# Patient Record
Sex: MALE | Race: WHITE | Employment: UNEMPLOYED | URBAN - METROPOLITAN AREA
[De-identification: names, ages, dates, MRNs, and addresses within clinical notes are randomized per-mention and may not be internally consistent; named-entity substitution may affect disease eponyms.]

---

## 2019-05-04 ENCOUNTER — HOSPITAL ENCOUNTER (EMERGENCY)
Age: 39
Discharge: HOME | End: 2019-05-04
Attending: EMERGENCY MEDICINE
Payer: COMMERCIAL

## 2019-05-04 VITALS
WEIGHT: 192 LBS | SYSTOLIC BLOOD PRESSURE: 136 MMHG | RESPIRATION RATE: 16 BRPM | HEART RATE: 56 BPM | OXYGEN SATURATION: 99 % | BODY MASS INDEX: 25.33 KG/M2 | DIASTOLIC BLOOD PRESSURE: 88 MMHG | TEMPERATURE: 98.4 F

## 2019-05-04 DIAGNOSIS — R11.2 NAUSEA AND VOMITING, INTRACTABILITY OF VOMITING NOT SPECIFIED, UNSPECIFIED VOMITING TYPE: Primary | ICD-10-CM

## 2019-05-04 DIAGNOSIS — A64 STD (MALE): ICD-10-CM

## 2019-05-04 LAB
A/G RATIO: 1.3 (ref 1.1–2.2)
ALBUMIN SERPL-MCNC: 4.3 G/DL (ref 3.4–5)
ALP BLD-CCNC: 65 U/L (ref 40–129)
ALT SERPL-CCNC: 13 U/L (ref 10–40)
ANION GAP SERPL CALCULATED.3IONS-SCNC: 13 MMOL/L (ref 3–16)
AST SERPL-CCNC: 19 U/L (ref 15–37)
BILIRUB SERPL-MCNC: 0.7 MG/DL (ref 0–1)
BUN BLDV-MCNC: 10 MG/DL (ref 7–20)
CALCIUM SERPL-MCNC: 9 MG/DL (ref 8.3–10.6)
CHLORIDE BLD-SCNC: 100 MMOL/L (ref 99–110)
CO2: 24 MMOL/L (ref 21–32)
CREAT SERPL-MCNC: 1 MG/DL (ref 0.9–1.3)
GFR AFRICAN AMERICAN: >60
GFR NON-AFRICAN AMERICAN: >60
GLOBULIN: 3.3 G/DL
GLUCOSE BLD-MCNC: 91 MG/DL (ref 70–99)
HCT VFR BLD CALC: 43.9 % (ref 40.5–52.5)
HEMOGLOBIN: 14.3 G/DL (ref 13.5–17.5)
LIPASE: 31 U/L (ref 13–60)
MAGNESIUM: 2.1 MG/DL (ref 1.8–2.4)
MCH RBC QN AUTO: 28.8 PG (ref 26–34)
MCHC RBC AUTO-ENTMCNC: 32.5 G/DL (ref 31–36)
MCV RBC AUTO: 88.7 FL (ref 80–100)
PDW BLD-RTO: 14.6 % (ref 12.4–15.4)
PLATELET # BLD: 201 K/UL (ref 135–450)
PMV BLD AUTO: 9.7 FL (ref 5–10.5)
POTASSIUM REFLEX MAGNESIUM: 3.5 MMOL/L (ref 3.5–5.1)
RBC # BLD: 4.95 M/UL (ref 4.2–5.9)
SODIUM BLD-SCNC: 137 MMOL/L (ref 136–145)
TOTAL PROTEIN: 7.6 G/DL (ref 6.4–8.2)
WBC # BLD: 6.7 K/UL (ref 4–11)

## 2019-05-04 PROCEDURE — 87591 N.GONORRHOEAE DNA AMP PROB: CPT

## 2019-05-04 PROCEDURE — 85027 COMPLETE CBC AUTOMATED: CPT

## 2019-05-04 PROCEDURE — 99284 EMERGENCY DEPT VISIT MOD MDM: CPT

## 2019-05-04 PROCEDURE — 80053 COMPREHEN METABOLIC PANEL: CPT

## 2019-05-04 PROCEDURE — 83690 ASSAY OF LIPASE: CPT

## 2019-05-04 PROCEDURE — 87491 CHLMYD TRACH DNA AMP PROBE: CPT

## 2019-05-04 PROCEDURE — 86780 TREPONEMA PALLIDUM: CPT

## 2019-05-04 PROCEDURE — 83735 ASSAY OF MAGNESIUM: CPT

## 2019-05-04 RX ORDER — ONDANSETRON 4 MG/1
4 TABLET, ORALLY DISINTEGRATING ORAL EVERY 8 HOURS PRN
Qty: 20 TABLET | Refills: 0 | Status: SHIPPED | OUTPATIENT
Start: 2019-05-04

## 2019-05-04 ASSESSMENT — ENCOUNTER SYMPTOMS
NAUSEA: 1
STRIDOR: 0
VOICE CHANGE: 0
BLOOD IN STOOL: 0
COUGH: 0
SHORTNESS OF BREATH: 0
DIARRHEA: 0
PHOTOPHOBIA: 0
ABDOMINAL PAIN: 1
TROUBLE SWALLOWING: 0
CONSTIPATION: 0
FACIAL SWELLING: 0
VOMITING: 1

## 2019-05-04 NOTE — ED PROVIDER NOTES
[address] [facility] RESIDENT NOTE       Date of evaluation: 5/4/2019    Chief Complaint     Exposure to STD and Emesis      History of Present Illness     Jerrell Adams is a 46 y.o. male with history of schizophrenia who presents with nonbloody emesis. Patient reports a several month history of one to 2 episodes of nonbloody nonbilious emesis per day that are not associated with oral intake. He endorses intermittent generalized abdominal pain, but does not currently have abdominal pain at this time. He presents today because he is \"just tired of the symptoms. \"  He denies any dysuria, hematuria, diarrhea, constipation or blood in stool. He denies any fevers. No recent travel or previous abdominal surgeries. Last bowel movement was yesterday. He also presents with concern for exposure to STI. Reports he has had 3-4 female partners in the last 6 months, and he only intermittently uses condoms. He denies any penile discharge or scrotal tenderness, no history of STI. He reports \"a bump\" on the right side of his penile shaft that was present 2 months ago but was only present for about a day. He is unable to describe the bump more thoroughly. Review of Systems     Review of Systems   Constitutional: Negative for chills and fever. HENT: Negative for drooling, facial swelling, trouble swallowing and voice change. Eyes: Negative for photophobia and visual disturbance. Respiratory: Negative for cough, shortness of breath and stridor. Cardiovascular: Negative for chest pain and leg swelling. Gastrointestinal: Positive for abdominal pain, nausea and vomiting. Negative for blood in stool, constipation and diarrhea. Genitourinary: Negative for dysuria and hematuria. Musculoskeletal: Negative for neck pain. Skin: Negative for rash. Neurological: Negative for weakness and headaches. Psychiatric/Behavioral: Negative for confusion.        Past Medical, Surgical, Family, and Social History     He has a past medical history of Schizophrenia ([facility] [address] He has no past surgical history on file. His family history is not on file. He reports that he has been smoking cigarettes. He has been smoking about 0.50 packs per day. He has never used smokeless tobacco. He reports that he does not drink alcohol or use drugs. Medications     Discharge Medication List as of 5/4/2019  4:39 PM          Allergies     He is allergic to depakote [divalproex sodium]. Physical Exam     INITIAL VITALS: BP: 136/88, Temp: 98.4 °F (36.9 °C), Pulse: 56, Resp: 16, SpO2: 99 %   Physical Exam   Constitutional: He is oriented to person, place, and time. He appears well-developed. No distress. HENT:   Head: Normocephalic and atraumatic. Eyes: Pupils are equal, round, and reactive to light. EOM are normal. Right eye exhibits no discharge. Left eye exhibits no discharge. Neck: Normal range of motion. No tracheal deviation present. Cardiovascular: Normal rate and regular rhythm. Exam reveals no gallop and no friction rub. No murmur heard. Pulmonary/Chest: Effort normal. No stridor. No respiratory distress. He has no wheezes. He has no rales. Abdominal: Soft. He exhibits no distension. There is no tenderness. There is no rebound and no guarding. Genitourinary: Penis normal. No penile tenderness. Genitourinary Comments: Normal external male genitalia without rashes, lesions. No penile discharge. No scrotal tenderness. No inguinal lymphadenopathy. Musculoskeletal: Normal range of motion. He exhibits no deformity. Neurological: He is alert and oriented to person, place, and time. No cranial nerve deficit. Skin: Skin is warm. No rash noted. Psychiatric: He has a normal mood and affect.        DiagnosticResults       RADIOLOGY:  No orders to display       LABS:   Results for orders placed or performed during the hospital encounter of 05/04/19   CBC   Result Value Ref Range    WBC 6.7 4.0 - 11.0 K/uL    RBC 4.95 4.20 - 5.90 M/uL    Hemoglobin 14.3 13.5 - 17.5 g/dL    Hematocrit 43.9 40.5 - 52.5 %    MCV 88.7 80.0 - 100.0 fL    MCH 28.8 26.0 - 34.0 pg    MCHC 32.5 31.0 - 36.0 g/dL    RDW 14.6 12.4 - 15.4 %    Platelets 445 649 - 457 K/uL    MPV 9.7 5.0 - 10.5 fL   Comprehensive Metabolic Panel w/ Reflex to MG   Result Value Ref Range    Sodium 137 136 - 145 mmol/L    Potassium reflex Magnesium 3.5 3.5 - 5.1 mmol/L    Chloride 100 99 - 110 mmol/L    CO2 24 21 - 32 mmol/L    Anion Gap 13 3 - 16    Glucose 91 70 - 99 mg/dL    BUN 10 7 - 20 mg/dL    CREATININE 1.0 0.9 - 1.3 mg/dL    GFR Non-African American >60 >60    GFR African American >60 >60    Calcium 9.0 8.3 - 10.6 mg/dL    Total Protein 7.6 6.4 - 8.2 g/dL    Alb 4.3 3.4 - 5.0 g/dL    Albumin/Globulin Ratio 1.3 1.1 - 2.2    Total Bilirubin 0.7 0.0 - 1.0 mg/dL    Alkaline Phosphatase 65 40 - 129 U/L    ALT 13 10 - 40 U/L    AST 19 15 - 37 U/L    Globulin 3.3 g/dL   Lipase   Result Value Ref Range    Lipase 31.0 13.0 - 60.0 U/L   Magnesium   Result Value Ref Range    Magnesium 2.10 1.80 - 2.40 mg/dL       ED BEDSIDE ULTRASOUND:  n/a    RECENT VITALS:  BP: 136/88, Temp: 98.4 °F (36.9 °C), Pulse: 56,Resp: 16, SpO2: 99 %     Procedures     n/a    ED Course     Nursing Notes, Past Medical Hx, Past Surgical Hx, Social Hx, Allergies, and Family Hx were reviewed. The patient was given the followingmedications:  Orders Placed This Encounter   Medications    ondansetron (ZOFRAN ODT) 4 MG disintegrating tablet     Sig: Take 1 tablet by mouth every 8 hours as needed for Nausea     Dispense:  20 tablet     Refill:  0       CONSULTS:  None    MEDICAL DECISION MAKING / ASSESSMENT / Pierre Liliya is a 46 y.o. male with history of schizophrenia who presents with nonbloody emesis and concern for STI exposure.     In terms of emesis, unclear if this is cyclic cannabinoid syndrome as patient does endorse smoking marijuana 3 times a week.  CMP without any evidence of a likely abnormality or evidence of dehydration, LFTs and lipase within normal limits. Patient did not have any nausea on presentation, did not have any episodes of emesis throughout the ED course. He did request Zofran when necessary to take at home when he does get nauseous, this was provided at discharge. At this time, low suspicion for intra-abdominal infection, vascular pathology, or bowel abnormality given reassuring abdominal exam on reassessment. In terms of STI exposure,  exam is within normal limits. Coumadin gonorrhea were collected, however given that patient does not currently have symptoms,. Treatment was deferred this time. He was strongly encouraged to present to the Department of Health for HIV and syphilis testing as we are unable to complete that here. He is also encouraged to call back in 72 hours for his results. This patient was also evaluated by the attending physician. All care plans werediscussed and agreed upon. Clinical Impression     1. Nausea and vomiting, intractability of vomiting not specified, unspecified vomiting type    2.  STD (male)        Disposition     PATIENT REFERRED TO:  [facility] Emergency Department  [address] [place] [address]  [phone]  Go to   As needed      DISCHARGE MEDICATIONS:  Discharge Medication List as of 5/4/2019  4:39 PM      START taking these medications    Details   ondansetron (ZOFRAN ODT) 4 MG disintegrating tablet Take 1 tablet by mouth every 8 hours as needed for Nausea, Disp-20 tablet, R-[id number]             DISPOSITION Decision To Discharge 05/04/2019 04:29:34 Cecilia Banks MD  Resident  05/04/19 [id number]

## 2019-05-05 LAB — TOTAL SYPHILLIS IGG/IGM: NORMAL

## 2019-05-07 LAB
C. TRACHOMATIS DNA ,URINE: NEGATIVE
N. GONORRHOEAE DNA, URINE: NEGATIVE

## 2019-10-09 ENCOUNTER — HOSPITAL ENCOUNTER (EMERGENCY)
Age: 39
Discharge: HOME | End: 2019-10-09
Attending: EMERGENCY MEDICINE
Payer: COMMERCIAL

## 2019-10-09 VITALS
DIASTOLIC BLOOD PRESSURE: 89 MMHG | SYSTOLIC BLOOD PRESSURE: 157 MMHG | TEMPERATURE: 97.7 F | OXYGEN SATURATION: 100 % | RESPIRATION RATE: 18 BRPM | HEART RATE: 104 BPM

## 2019-10-09 DIAGNOSIS — T16.1XXA FOREIGN BODY OF EAR, RIGHT, INITIAL ENCOUNTER: Primary | ICD-10-CM

## 2019-10-09 PROCEDURE — 99282 EMERGENCY DEPT VISIT SF MDM: CPT

## 2019-10-09 PROCEDURE — 4500000023 HC ED LEVEL 3 PROCEDURE

## 2021-01-15 ENCOUNTER — HOSPITAL ENCOUNTER (EMERGENCY)
Age: 41
Discharge: HOME | End: 2021-01-15
Attending: EMERGENCY MEDICINE
Payer: COMMERCIAL

## 2021-01-15 ENCOUNTER — APPOINTMENT (OUTPATIENT)
Dept: GENERAL RADIOLOGY | Age: 41
End: 2021-01-15
Payer: COMMERCIAL

## 2021-01-15 VITALS
RESPIRATION RATE: 18 BRPM | HEIGHT: 74 IN | WEIGHT: 237 LBS | OXYGEN SATURATION: 99 % | HEART RATE: 71 BPM | BODY MASS INDEX: 30.42 KG/M2 | SYSTOLIC BLOOD PRESSURE: 121 MMHG | TEMPERATURE: 99.3 F | DIASTOLIC BLOOD PRESSURE: 82 MMHG

## 2021-01-15 DIAGNOSIS — R06.00 DYSPNEA, UNSPECIFIED TYPE: Primary | ICD-10-CM

## 2021-01-15 DIAGNOSIS — R42 LIGHTHEADEDNESS: ICD-10-CM

## 2021-01-15 DIAGNOSIS — J12.9 VIRAL PNEUMONIA: ICD-10-CM

## 2021-01-15 LAB
ANION GAP SERPL CALCULATED.3IONS-SCNC: 8 MMOL/L (ref 3–16)
BASOPHILS ABSOLUTE: 0 K/UL (ref 0–0.2)
BASOPHILS RELATIVE PERCENT: 0.4 %
BUN BLDV-MCNC: 14 MG/DL (ref 7–20)
CALCIUM SERPL-MCNC: 9.2 MG/DL (ref 8.3–10.6)
CHLORIDE BLD-SCNC: 104 MMOL/L (ref 99–110)
CO2: 28 MMOL/L (ref 21–32)
CREAT SERPL-MCNC: 1 MG/DL (ref 0.9–1.3)
EKG ATRIAL RATE: 77 BPM
EKG DIAGNOSIS: NORMAL
EKG P AXIS: 40 DEGREES
EKG P-R INTERVAL: 240 MS
EKG Q-T INTERVAL: 350 MS
EKG QRS DURATION: 104 MS
EKG QTC CALCULATION (BAZETT): 396 MS
EKG R AXIS: 42 DEGREES
EKG T AXIS: 22 DEGREES
EKG VENTRICULAR RATE: 77 BPM
EOSINOPHILS ABSOLUTE: 0.1 K/UL (ref 0–0.6)
EOSINOPHILS RELATIVE PERCENT: 0.5 %
GFR AFRICAN AMERICAN: >60
GFR NON-AFRICAN AMERICAN: >60
GLUCOSE BLD-MCNC: 87 MG/DL (ref 70–99)
HCT VFR BLD CALC: 41.9 % (ref 40.5–52.5)
HEMOGLOBIN: 13.9 G/DL (ref 13.5–17.5)
LYMPHOCYTES ABSOLUTE: 2 K/UL (ref 1–5.1)
LYMPHOCYTES RELATIVE PERCENT: 18.4 %
MCH RBC QN AUTO: 29.5 PG (ref 26–34)
MCHC RBC AUTO-ENTMCNC: 33 G/DL (ref 31–36)
MCV RBC AUTO: 89.2 FL (ref 80–100)
MONOCYTES ABSOLUTE: 0.8 K/UL (ref 0–1.3)
MONOCYTES RELATIVE PERCENT: 7.8 %
NEUTROPHILS ABSOLUTE: 7.8 K/UL (ref 1.7–7.7)
NEUTROPHILS RELATIVE PERCENT: 72.9 %
PDW BLD-RTO: 14.2 % (ref 12.4–15.4)
PLATELET # BLD: 199 K/UL (ref 135–450)
PMV BLD AUTO: 10.4 FL (ref 5–10.5)
POTASSIUM REFLEX MAGNESIUM: 4.1 MMOL/L (ref 3.5–5.1)
RAPID INFLUENZA  B AGN: NEGATIVE
RAPID INFLUENZA A AGN: NEGATIVE
RBC # BLD: 4.7 M/UL (ref 4.2–5.9)
SODIUM BLD-SCNC: 140 MMOL/L (ref 136–145)
WBC # BLD: 10.6 K/UL (ref 4–11)

## 2021-01-15 PROCEDURE — 80048 BASIC METABOLIC PNL TOTAL CA: CPT

## 2021-01-15 PROCEDURE — 2580000003 HC RX 258: Performed by: PHYSICIAN ASSISTANT

## 2021-01-15 PROCEDURE — 96374 THER/PROPH/DIAG INJ IV PUSH: CPT

## 2021-01-15 PROCEDURE — 71045 X-RAY EXAM CHEST 1 VIEW: CPT

## 2021-01-15 PROCEDURE — 99284 EMERGENCY DEPT VISIT MOD MDM: CPT

## 2021-01-15 PROCEDURE — 85025 COMPLETE CBC W/AUTO DIFF WBC: CPT

## 2021-01-15 PROCEDURE — U0003 INFECTIOUS AGENT DETECTION BY NUCLEIC ACID (DNA OR RNA); SEVERE ACUTE RESPIRATORY SYNDROME CORONAVIRUS 2 (SARS-COV-2) (CORONAVIRUS DISEASE [COVID-19]), AMPLIFIED PROBE TECHNIQUE, MAKING USE OF HIGH THROUGHPUT TECHNOLOGIES AS DESCRIBED BY CMS-2020-01-R: HCPCS

## 2021-01-15 PROCEDURE — 87804 INFLUENZA ASSAY W/OPTIC: CPT

## 2021-01-15 PROCEDURE — 93005 ELECTROCARDIOGRAM TRACING: CPT | Performed by: EMERGENCY MEDICINE

## 2021-01-15 PROCEDURE — 6360000002 HC RX W HCPCS: Performed by: PHYSICIAN ASSISTANT

## 2021-01-15 RX ORDER — KETOROLAC TROMETHAMINE 30 MG/ML
15 INJECTION, SOLUTION INTRAMUSCULAR; INTRAVENOUS ONCE
Status: COMPLETED | OUTPATIENT
Start: 2021-01-15 | End: 2021-01-15

## 2021-01-15 RX ORDER — IBUPROFEN 800 MG/1
800 TABLET ORAL EVERY 8 HOURS PRN
Qty: 30 TABLET | Refills: 0 | Status: SHIPPED | OUTPATIENT
Start: 2021-01-15

## 2021-01-15 RX ORDER — SODIUM CHLORIDE, SODIUM LACTATE, POTASSIUM CHLORIDE, CALCIUM CHLORIDE 600; 310; 30; 20 MG/100ML; MG/100ML; MG/100ML; MG/100ML
1000 INJECTION, SOLUTION INTRAVENOUS ONCE
Status: COMPLETED | OUTPATIENT
Start: 2021-01-15 | End: 2021-01-15

## 2021-01-15 RX ADMIN — SODIUM CHLORIDE, SODIUM LACTATE, POTASSIUM CHLORIDE, AND CALCIUM CHLORIDE 1000 ML: .6; .31; .03; .02 INJECTION, SOLUTION INTRAVENOUS at 19:25

## 2021-01-15 RX ADMIN — KETOROLAC TROMETHAMINE 15 MG: 30 INJECTION, SOLUTION INTRAMUSCULAR; INTRAVENOUS at 19:24

## 2021-01-15 NOTE — ED TRIAGE NOTES
Pt arrived to ED with shortness of breath and dizziness at night for the past 2 nights. States today shortness of breath happened during day as well. Denies any shortness of breath or dizziness at this time.

## 2021-01-15 NOTE — ED PROVIDER NOTES
[address] [facility] RESIDENT NOTE       Date of evaluation: 1/15/2021    Chief Complaint     Dizziness and Shortness of Breath      History of Present Illness     Anabel Alonso is a 45 y.o. male 3 of schizophrenia, bipolar disorder presenting with shortness of breath and dizziness. Patient reports that 2 nights ago he had an episode where he was woken from sleep with shortness of breath and felt lightheaded at this time. He reports that since then he has had similar episodes of shortness of breath and lightheadedness. Patient has no current symptoms. He reports that symptoms typically occur at rest, are nonexertional in nature. He reports recent nonproductive cough. Patient otherwise denies fever, chills, headache, chest pain, abdominal pain, nausea, vomiting, leg swelling, recent surgeries or travel, or other symptoms at this time. Patient denies personal or family history of cardiac disease. Denies any history of blood clots. Other than stated above, no additional aggravating or alleviating factors are noted. Review of Systems     A complete review of systems was performed and is negative except as otherwise noted in the HPI. Past Medical, Surgical, Family, and Social History     He has a past medical history of Schizophrenia ([facility] [address] He has a past surgical history that includes Tonsillectomy. His family history is not on file. He reports that he has been smoking cigarettes. He has been smoking about 0.50 packs per day. He has never used smokeless tobacco. He reports current drug use. Drug: Marijuana. He reports that he does not drink alcohol. Medications     Previous Medications    ONDANSETRON (ZOFRAN ODT) 4 MG DISINTEGRATING TABLET    Take 1 tablet by mouth every 8 hours as needed for Nausea       Allergies     He is allergic to depakote [divalproex sodium].     Physical Exam     INITIAL VITALS: BP: 121/82, Temp: 99.3 °F (37.4 °C), Pulse: 71, Resp: 18, SpO2: 99 %   General:  Well appearing. No acute distress. Eyes:  PERRL. No discharge from eyes   ENT:  No discharge from nose. OP clear  Neck:  Supple, trachea midline  Pulmonary:   Non-labored breathing. Breath sounds clear bilaterally  Cardiac:  Regular rate and rhythm. No murmurs  Abdomen:  Soft. Non-distended. Non-tender. Musculoskeletal:  No long bone deformity. Vascular:  Extremities warm and perfused. Normal pulses in all 4 extremities  Skin:  Dry, no rashes  Extremities:  No peripheral edema. No calf tenderness or asymmetry. Neuro: Alert. Moves all four extremities to command. Sensation grossly intact to light touch. Speech and mentation normal. No focal deficit. Gait narrow and stable. Diagnostic Results     EKG   Interpreted in conjunction with emergency department physician No att. providers found  Rhythm: normal sinus   Rate: normal  Axis: normal  Ectopy: none  Conduction: 1st degree AV block  ST Segments: no acute change  T Waves:inversion in  III  Q Waves: v1  Clinical Impression: sinus rhythm with 1st degree AV block   Comparison:  None available    RADIOLOGY:  XR CHEST PORTABLE    (Results Pending)       LABS:   No results found for this visit on 01/15/21. RECENT VITALS:  BP: 121/82, Temp: 99.3 °F (37.4 °C), Pulse: 71,Resp: 18, SpO2: 99 %     Procedures     ED Course     Nursing Notes, Past Medical Hx, Past Surgical Hx, Social Hx, Allergies, and Family Hx were reviewed. The patient was given the followingmedications:  No orders of the defined types were placed in this encounter. CONSULTS:  None    MEDICAL DECISION MAKING / ASSESSMENT / Reagan Jesus is a 45 y.o. male with a history and presentation as described above in HPI. The patient was evaluated by myself and the ED Attending Physician, Dr. Karine Brooke. All management and disposition plans were discussed and agreed upon.     Upon presentation, the patient was well appearing and non-tachycardic, not tachypneic. SPO2 99% on room air. Patient presenting with intermittent dyspnea and lightheadedness over the past 2 days as well as nonproductive cough. Denies chest pain, fever, or other symptoms. Patient without cardiac history or risk factors for DVT/PE. Patient without any current symptoms. EKG shows sinus rhythm with first-degree AV block, no other evidence of overt ischemia or arrhythmia. Initial screening lab work obtained including CBC, renal panel and pending at time of signout to oncoming provider. Chest x-ray ordered and pending at time of signout to oncoming provider. Swabs ordered for Covid, influenza. Medications received during this ED visit:    Medications - No data to display    Clinical Impression     1. Dyspnea, unspecified type    2. Lightheadedness        Disposition     PATIENT REFERRED TO:  No follow-up provider specified. DISCHARGE MEDICATIONS:  New Prescriptions    No medications on file       DISPOSITION    Sign out: At this time I am going off-service and will be signing out care of this patient to my colleague YULI Little for further care. Please refer to reassessment note for updates. My colleague's responsibilities will include: follow-up lab work, chest x-ray, disposition likely discharge.         Norberto Pederson MD  01/15/21 6141

## 2021-01-16 ENCOUNTER — CARE COORDINATION (OUTPATIENT)
Dept: CARE COORDINATION | Age: 41
End: 2021-01-16

## 2021-01-16 LAB — SARS-COV-2, PCR: NOT DETECTED

## 2021-01-16 NOTE — ED PROVIDER NOTES
[address] [facility] ENCOUNTER          PHYSICIAN ASSISTANT NOTE     Date of evaluation: 1/15/2021    ADDENDUM:      Care of this patient was assumed from Gagandeep Drake MD.  The patient was seen for Dizziness and Shortness of Breath  . The patient's initial evaluation and plan have been discussed with the prior provider who initially evaluated the patient. Nursing Notes, Past Medical Hx, Past Surgical Hx, Social Hx, Allergies, and Family Hx were all reviewed. Patient turned over to me to follow up on imaging and labs. Diagnostic Results     RADIOLOGY:  XR CHEST PORTABLE   Final Result      Subtle patchy airspace disease in the right mid and bilateral lower lungs.              LABS:   Results for orders placed or performed during the hospital encounter of 01/15/21   Rapid influenza A/B antigens    Specimen: Nasopharyngeal   Result Value Ref Range    Rapid Influenza A Ag Negative Negative    Rapid Influenza B Ag Negative Negative   CBC Auto Differential   Result Value Ref Range    WBC 10.6 4.0 - 11.0 K/uL    RBC 4.70 4.20 - 5.90 M/uL    Hemoglobin 13.9 13.5 - 17.5 g/dL    Hematocrit 41.9 40.5 - 52.5 %    MCV 89.2 80.0 - 100.0 fL    MCH 29.5 26.0 - 34.0 pg    MCHC 33.0 31.0 - 36.0 g/dL    RDW 14.2 12.4 - 15.4 %    Platelets 290 994 - 766 K/uL    MPV 10.4 5.0 - 10.5 fL    Neutrophils % 72.9 %    Lymphocytes % 18.4 %    Monocytes % 7.8 %    Eosinophils % 0.5 %    Basophils % 0.4 %    Neutrophils Absolute 7.8 (H) 1.7 - 7.7 K/uL    Lymphocytes Absolute 2.0 1.0 - 5.1 K/uL    Monocytes Absolute 0.8 0.0 - 1.3 K/uL    Eosinophils Absolute 0.1 0.0 - 0.6 K/uL    Basophils Absolute 0.0 0.0 - 0.2 K/uL   Basic Metabolic Panel w/ Reflex to MG   Result Value Ref Range    Sodium 140 136 - 145 mmol/L    Potassium reflex Magnesium 4.1 3.5 - 5.1 mmol/L    Chloride 104 99 - 110 mmol/L    CO2 28 21 - 32 mmol/L    Anion Gap 8 3 - 16    Glucose 87 70 - 99 mg/dL    BUN 14 7 - 20 mg/dL    CREATININE 1.0 0.9 - 1.3 mg/dL    GFR Non-African American >60 >60    GFR African American >60 >60    Calcium 9.2 8.3 - 10.6 mg/dL   EKG 12 Lead   Result Value Ref Range    Ventricular Rate 77 BPM    Atrial Rate 77 BPM    P-R Interval 240 ms    QRS Duration 104 ms    Q-T Interval 350 ms    QTc Calculation (Bazett) 396 ms    P Axis 40 degrees    R Axis 42 degrees    T Axis 22 degrees    Diagnosis       EKG performed in ER and to be interpreted by ER physician. Confirmed by MD, ER ([id number]),  Loren Hall ([id number]) on 1/15/2021 6:40:23 PM       RECENT VITALS:  BP: 121/82, Temp: 99.3 °F (37.4 °C), Pulse: 71, Resp: 18, SpO2: 99 %     Procedures       ED Course     The patient was given the following medications:  Orders Placed This Encounter   Medications    lactated ringers infusion 1,000 mL    ketorolac (TORADOL) injection 15 mg    ibuprofen (ADVIL;MOTRIN) 800 MG tablet     Sig: Take 1 tablet by mouth every 8 hours as needed for Pain (Take with food)     Dispense:  30 tablet     Refill:  0       CONSULTS:  None    MEDICAL DECISION MAKING / ASSESSMENT / Scarlet Armando is a 45 y.o. male presented with shortness of breath and feeling lightheaded as well as cough. Patient was initially seen by the resident physician and turned over to me to follow-up on labs and imaging. CBC shows white count of 10.6 with hemoglobin 13.9. BMP is within normal limits with glucose of 87 and creatinine 1.0. Rapid flu is negative. Covid test sent and pending. Chest x-ray shows subtle patchy airspace disease in the right mid and bilateral lower lungs. This is likely concerning for viral pneumonia/Covid. Patient has maintained his O2 sat at 99% on room air. He was given some IV fluids and Toradol and is feeling better. At this point he stable for discharge. He was told to quarantine for 10 days and until symptom free for 3 days. Patient prescribed ibuprofen 800 to help with pain and fever. He can also take Tylenol over-the-counter.   He is

## 2021-01-16 NOTE — ED NOTES
Patient prepared for and ready to be discharged. Patient discharged at this time in no acute distress after verbalizing understanding of discharge instructions. Patient left after receiving After Visit Summary instructions.       Zaira Mcclain RN  01/15/21 2029

## 2021-01-16 NOTE — CARE COORDINATION
Patient contacted regarding COVID-19 exposure. Discussed COVID-19 related testing which was pending at this time. Test results were pending. Patient informed of results, if available? pending    Care Transition Nurse/ Ambulatory Care Manager contacted the parent by telephone to perform post discharge assessment. Call within 2 business days of discharge: Yes. Verified name and  with parent as identifiers. Provided introduction to self, and explanation of the CTN/ACM role, and reason for call due to risk factors for infection and/or exposure to COVID-19. Symptoms reviewed with parent who verbalized the following symptoms: cough, shortness of breath and dizziness/lightheadedness. Due to no new or worsening symptoms encounter was not routed to provider for escalation. Discussed follow-up appointments. If no appointment was previously scheduled, appointment scheduling offered: Yes  [facility] follow up appointment(s): No future appointments. Non-[facility] follow up appointment(s): none    Non-face-to-face services provided:  Obtained and reviewed discharge summary and/or continuity of care documents     Advance Care Planning:   Does patient have an Advance Directive:  reviewed and current. Patient has following risk factors of: paranoid schizophrenia. CTN/ACM reviewed discharge instructions, medical action plan and red flags such as increased shortness of breath, increasing fever and signs of decompensation with parent who verbalized understanding. Discussed exposure protocols and quarantine with CDC Guidelines What to do if you are sick with coronavirus disease 2019.  Parent was given an opportunity for questions and concerns. The parent agrees to contact the [facility] exposure line [phone], local [facility] department [place] Department of Health: ([phone]) and PCP office for questions related to their healthcare. CTN/ACM provided contact information for future needs.     Reviewed and educated parent on any

## 2021-01-27 ENCOUNTER — CARE COORDINATION (OUTPATIENT)
Dept: CARE COORDINATION | Age: 41
End: 2021-01-27

## 2021-01-27 NOTE — CARE COORDINATION
Your Patient resolved from the Care Transitions episode on 1/16/21  Discussed COVID-19 related testing which was available at this time. Test results were negative. Patient informed of results, if available? Yes    Patient/family has been provided the following resources and education related to COVID-19:                         Signs, symptoms and red flags related to COVID-19            CDC exposure and quarantine guidelines            [facility] exposure contact - [phone]            Contact for their local Department of Health                 Patient currently reports that the following symptoms have improved:  cough, shortness of breath, dizziness/lightheadedness and no new/worsening symptoms. Spoke with patient's mother who reports he is doing better but continues with a dry cough. Directed her to have him FU with his PCP. No further outreach scheduled with this CTN/ACM. Episode of Care resolved. Patient has this CTN/ACM contact information if future needs arise. Plan  No further outreach is necessary    Nikolas Taylor.  Sandrine Guajardo RN, BSN, [address] [place] [address] Primary Care  [phone]